# Patient Record
Sex: FEMALE | Race: WHITE | NOT HISPANIC OR LATINO | Employment: FULL TIME | URBAN - METROPOLITAN AREA
[De-identification: names, ages, dates, MRNs, and addresses within clinical notes are randomized per-mention and may not be internally consistent; named-entity substitution may affect disease eponyms.]

---

## 2021-03-26 DIAGNOSIS — Z23 ENCOUNTER FOR IMMUNIZATION: ICD-10-CM

## 2024-09-03 ENCOUNTER — OFFICE VISIT (OUTPATIENT)
Dept: GASTROENTEROLOGY | Facility: CLINIC | Age: 44
End: 2024-09-03
Payer: COMMERCIAL

## 2024-09-03 VITALS
OXYGEN SATURATION: 98 % | HEIGHT: 65 IN | SYSTOLIC BLOOD PRESSURE: 120 MMHG | HEART RATE: 72 BPM | DIASTOLIC BLOOD PRESSURE: 72 MMHG | BODY MASS INDEX: 24.99 KG/M2 | TEMPERATURE: 98 F | WEIGHT: 150 LBS

## 2024-09-03 DIAGNOSIS — Z90.49 HISTORY OF CHOLECYSTECTOMY: ICD-10-CM

## 2024-09-03 DIAGNOSIS — R14.0 ABDOMINAL BLOATING: Primary | ICD-10-CM

## 2024-09-03 DIAGNOSIS — R19.7 DIARRHEA, UNSPECIFIED TYPE: ICD-10-CM

## 2024-09-03 PROCEDURE — 99204 OFFICE O/P NEW MOD 45 MIN: CPT | Performed by: DIETITIAN, REGISTERED

## 2024-09-03 RX ORDER — CETIRIZINE HYDROCHLORIDE 10 MG/1
10 TABLET ORAL DAILY
COMMUNITY

## 2024-09-03 RX ORDER — ETONOGESTREL AND ETHINYL ESTRADIOL VAGINAL RING .015; .12 MG/D; MG/D
1 RING VAGINAL
COMMUNITY
Start: 2018-09-03

## 2024-09-03 RX ORDER — MONTELUKAST SODIUM 4 MG/1
1 TABLET, CHEWABLE ORAL DAILY
Qty: 30 TABLET | Refills: 3 | Status: SHIPPED | OUTPATIENT
Start: 2024-09-03

## 2024-09-03 NOTE — PROGRESS NOTES
[facility] - Outpatient Consultation New Patient  Ashley Pena 43 y.o. female MRN: [id number]  Encounter: [id number]          ASSESSMENT AND PLAN:    1.  Abdominal bloating  2.  Diarrhea  3.  History of cholecystectomy  Patient reports waxing/waning diarrhea dating back to childhood.  She had a cholecystectomy in 2005.  She previously discovered that dairy is a trigger for diarrhea so she avoids this as best she can.  She currently denies any stressors in her life and yet bowel habits remain irregular.  She has 1-2 loose/urgent BMs daily with chronic abdominal bloating, gurgling, fatigue, and intermittent LLQ abdominal pain/tenderness.  She denies any heartburn, severe abdominal pain, blood in the stools, unintentional weight loss.  No known family history of celiac disease, IBD, or CRC.  No prior colonoscopy.  CBC, CMP, TSH, hemoglobin A1c normal 6/10/2024.    -Check celiac disease antibody profile, CRP, fecal calprotectin, fecal elastase.  -Start colestipol 1 gm once daily.  Advised to keep at least 4 hours separate from other medications and discussed potential side effects including constipation.  -Patient will be due for screening colonoscopy in 12/2024.  No red flag symptoms at present that would indicate a sooner/diagnostic colonoscopy.  -Advised patient to call the office or send a [employer] message with any questions/concerns or any new/worsening symptoms.    I discussed informed consent with the patient. The risks/benefits/alternatives of the procedure were discussed with the patient. Risks included, but not limited to, infection, bleeding, perforation, injury to organs in the abdomen, missed lesion and incomplete procedure were discussed. Patient was agreeable.      Follow up 2-3 months.    ________________________________________________________    HPI:  Ashley Pena is a 43 y.o. female who presents for evaluation of diarrhea.  CBC, CMP, TSH, hemoglobin A1c normal  6/10/2024.    Patient reports waxing/waning diarrhea dating back to childhood.  She had a cholecystectomy in 2005.  She previously discovered that dairy is a trigger for diarrhea so she avoids this as best she can.  She currently denies any stressors in her life and yet bowel habits remain irregular.  She has 1-2 loose/urgent BMs daily with chronic abdominal bloating, gurgling, fatigue, and intermittent LLQ abdominal pain/tenderness.  She denies any heartburn, severe abdominal pain, blood in the stools, unintentional weight loss.  No known family history of celiac disease, IBD, or CRC.  No prior colonoscopy.      Answers submitted by the patient for this visit:  Abdominal Pain Questionnaire (Submitted on 9/3/2024)  Chief Complaint: Abdominal pain  Chronicity: chronic  Onset: more than 1 year ago  Onset quality: gradual  Frequency: daily  Episode duration: 12 Months  Progression since onset: waxing and waning  Pain location: LLQ, left flank  Pain - numeric: 4/10  Pain quality: cramping  Radiates to: LLQ, left flank  anorexia: No  arthralgias: No  belching: No  constipation: No  diarrhea: Yes  dysuria: No  fever: No  flatus: Yes  frequency: No  headaches: No  hematochezia: No  hematuria: No  melena: No  myalgias: No  nausea: Yes  weight loss: No  vomiting: No  Aggravated by: eating  Relieved by: bowel movements, passing flatus        REVIEW OF SYSTEMS:  10 point ROS reviewed and negative, except as above    Historical Information   History reviewed. No pertinent past medical history.  Past Surgical History:   Procedure Laterality Date   • CHOLECYSTECTOMY  2004     Social History   Social History     Substance and Sexual Activity   Alcohol Use Yes   • Alcohol/week: 2.0 standard drinks of alcohol   • Types: 1 Glasses of wine, 1 Cans of beer per week     Social History     Substance and Sexual Activity   Drug Use Never     Social History     Tobacco Use   Smoking Status Never   Smokeless Tobacco Never     Family History  "  Problem Relation Age of Onset   • Hypertension Father    • Heart disease Maternal Grandfather        Meds/Allergies       Current Outpatient Medications:   •  cetirizine (ZyrTEC) 10 mg tablet  •  colestipol (COLESTID) 1 g tablet  •  etonogestrel-ethinyl estradiol (NUVARING) 0.12-0.015 MG/24HR vaginal ring    No Known Allergies        Objective   Wt Readings from Last 3 Encounters:   09/03/24 68 kg (150 lb)     Temp Readings from Last 3 Encounters:   09/03/24 98 °F (36.7 °C) (Tympanic)     BP Readings from Last 3 Encounters:   09/03/24 120/72     Pulse Readings from Last 3 Encounters:   09/03/24 72        PHYSICAL EXAM:     Physical Exam  Vitals reviewed.   Constitutional:       General: She is not in acute distress.     Appearance: She is well-developed.   HENT:      Head: Normocephalic and atraumatic.   Eyes:      Conjunctiva/sclera: Conjunctivae normal.   Cardiovascular:      Rate and Rhythm: Normal rate and regular rhythm.      Heart sounds: No murmur heard.  Pulmonary:      Effort: Pulmonary effort is normal. No respiratory distress.      Breath sounds: Normal breath sounds.   Abdominal:      General: Bowel sounds are normal. There is no distension.      Palpations: Abdomen is soft.      Tenderness: There is no abdominal tenderness. There is no guarding.   Musculoskeletal:         General: No swelling.      Cervical back: Neck supple.   Skin:     General: Skin is warm and dry.   Neurological:      Mental Status: She is alert.   Psychiatric:         Mood and Affect: Mood normal.             Lab Results:   No visits with results within 1 Day(s) from this visit.   Latest known visit with results is:   No results found for any previous visit.       No results found for: \"WBC\", \"HGB\", \"HCT\", \"MCV\", \"PLT\"    Lab Results   Component Value Date    SODIUM 139 06/10/2024    K 4.2 06/10/2024     06/10/2024    CO2 22 06/10/2024    AGAP 11 06/10/2024    BUN 14 06/10/2024    CREATININE 0.96 06/10/2024    GLUC 88 " 06/10/2024    CALCIUM 8.9 06/10/2024    AST 14 06/10/2024    ALT 8 06/10/2024    ALKPHOS 62 06/10/2024    TP 6.6 06/10/2024    TBILI 0.5 06/10/2024    EGFR 75 06/10/2024         Radiology Results:   No results found.

## 2024-09-04 DIAGNOSIS — Z00.6 ENCOUNTER FOR EXAMINATION FOR NORMAL COMPARISON OR CONTROL IN CLINICAL RESEARCH PROGRAM: ICD-10-CM

## 2024-09-24 ENCOUNTER — APPOINTMENT (OUTPATIENT)
Dept: LAB | Facility: CLINIC | Age: 44
End: 2024-09-24

## 2024-09-24 DIAGNOSIS — Z00.6 ENCOUNTER FOR EXAMINATION FOR NORMAL COMPARISON OR CONTROL IN CLINICAL RESEARCH PROGRAM: ICD-10-CM

## 2024-09-24 PROCEDURE — 36415 COLL VENOUS BLD VENIPUNCTURE: CPT

## 2024-10-04 LAB
APOB+LDLR+PCSK9 GENE MUT ANL BLD/T: NOT DETECTED
BRCA1+BRCA2 DEL+DUP + FULL MUT ANL BLD/T: NOT DETECTED
MLH1+MSH2+MSH6+PMS2 GN DEL+DUP+FUL M: NOT DETECTED

## 2024-11-13 ENCOUNTER — TELEPHONE (OUTPATIENT)
Age: 44
End: 2024-11-13

## 2024-11-13 ENCOUNTER — OFFICE VISIT (OUTPATIENT)
Age: 44
End: 2024-11-13
Payer: COMMERCIAL

## 2024-11-13 VITALS
SYSTOLIC BLOOD PRESSURE: 118 MMHG | DIASTOLIC BLOOD PRESSURE: 78 MMHG | OXYGEN SATURATION: 98 % | TEMPERATURE: 97.9 F | WEIGHT: 149.4 LBS | HEART RATE: 73 BPM | BODY MASS INDEX: 24.89 KG/M2 | HEIGHT: 65 IN

## 2024-11-13 DIAGNOSIS — Z90.49 HISTORY OF CHOLECYSTECTOMY: ICD-10-CM

## 2024-11-13 DIAGNOSIS — K90.89 BILE SALT-INDUCED DIARRHEA: Primary | ICD-10-CM

## 2024-11-13 DIAGNOSIS — Z12.11 SCREENING FOR COLON CANCER: ICD-10-CM

## 2024-11-13 PROCEDURE — 99214 OFFICE O/P EST MOD 30 MIN: CPT | Performed by: DIETITIAN, REGISTERED

## 2024-11-13 RX ORDER — SODIUM CHLORIDE, SODIUM LACTATE, POTASSIUM CHLORIDE, CALCIUM CHLORIDE 600; 310; 30; 20 MG/100ML; MG/100ML; MG/100ML; MG/100ML
125 INJECTION, SOLUTION INTRAVENOUS CONTINUOUS
OUTPATIENT
Start: 2024-11-13

## 2024-11-13 RX ORDER — MONTELUKAST SODIUM 4 MG/1
1 TABLET, CHEWABLE ORAL DAILY
Qty: 90 TABLET | Refills: 3 | Status: SHIPPED | OUTPATIENT
Start: 2024-11-13

## 2024-11-13 NOTE — ASSESSMENT & PLAN NOTE
Patient has a history of a cholecystectomy in 2005.  She had a chronic history of 1-2 loose/urgent BMs daily with abdominal pain/bloating.  All her symptoms have resolved since starting colestipol 1 g once daily.  Orders:    colestipol (COLESTID) 1 g tablet; Take 1 tablet (1 g total) by mouth daily  -Continue colestipol 1 g once daily.  Advised patient she can take a second tablet as needed, for example if she is eating a fattier meal.

## 2024-11-13 NOTE — TELEPHONE ENCOUNTER
Procedure: colon  Date: March 10th  Physician performing: Dr. Padgett  Location of procedure:  [place]  Instructions given to patient: Miralax  Diabetic: n/a  Clearances: n/a

## 2024-11-13 NOTE — PROGRESS NOTES
Ambulatory Visit  Name: Ashley Pena      : 1980      MRN: [id number]  Encounter Provider: Mitchell De La O PA-C  Encounter Date: 2024   Encounter department: [facility] [place]    Assessment & Plan  Bile salt-induced diarrhea  Patient has a history of a cholecystectomy in .  She had a chronic history of 1-2 loose/urgent BMs daily with abdominal pain/bloating.  All her symptoms have resolved since starting colestipol 1 g once daily.  Orders:    colestipol (COLESTID) 1 g tablet; Take 1 tablet (1 g total) by mouth daily  -Continue colestipol 1 g once daily.  Advised patient she can take a second tablet as needed, for example if she is eating a fattier meal.  History of cholecystectomy  As above  Orders:    colestipol (COLESTID) 1 g tablet; Take 1 tablet (1 g total) by mouth daily    Screening for colon cancer  No family history of CRC.  No prior colonoscopy.  Patient is turning 44 later this month, so we will plan a screening colonoscopy.  Orders:    Colonoscopy; Future  -Schedule colonoscopy in  for routine screening.  I discussed informed consent with the patient. The risks/benefits/alternatives of the procedure were discussed with the patient. Risks included, but not limited to, infection, bleeding, perforation, injury to organs in the abdomen, missed lesion and incomplete procedure were discussed. Patient was agreeable.     Follow up 1 year.      History of Present Illness     Ashley Pena is a 43 y.o. female who presents for follow-up of diarrhea.  Last office visit 2024.  Labs 2024 reviewed.  Celiac disease panel, CRP, fecal calprotectin, and fecal elastase normal.    Patient reports her GI symptoms have completely resolved since starting colestipol 1 g once daily.  She is feeling very well and has no questions or concerns today.        Review of Systems   All other systems reviewed and are negative.          Objective     /78 (BP  "Location: Left arm, Patient Position: Sitting, Cuff Size: Standard)   Pulse 73   Temp 97.9 °F (36.6 °C) (Tympanic)   Ht 5' 5\" (1.651 m)   Wt 67.8 kg (149 lb 6.4 oz)   SpO2 98%   BMI 24.86 kg/m²     Physical Exam  Constitutional:       Appearance: Normal appearance.   HENT:      Head: Normocephalic and atraumatic.   Cardiovascular:      Rate and Rhythm: Normal rate and regular rhythm.   Pulmonary:      Effort: Pulmonary effort is normal.      Breath sounds: Normal breath sounds. No wheezing.   Skin:     Coloration: Skin is not jaundiced or pale.   Neurological:      Mental Status: She is alert.   Psychiatric:         Mood and Affect: Mood normal.         Behavior: Behavior normal.         "

## 2024-12-13 PROBLEM — K90.89 BILE SALT-INDUCED DIARRHEA: Status: RESOLVED | Noted: 2024-11-13 | Resolved: 2024-12-13

## 2025-02-24 ENCOUNTER — ANESTHESIA (OUTPATIENT)
Dept: ANESTHESIOLOGY | Facility: HOSPITAL | Age: 45
End: 2025-02-24

## 2025-02-24 ENCOUNTER — ANESTHESIA EVENT (OUTPATIENT)
Dept: ANESTHESIOLOGY | Facility: HOSPITAL | Age: 45
End: 2025-02-24

## 2025-03-03 ENCOUNTER — TELEPHONE (OUTPATIENT)
Age: 45
End: 2025-03-03

## 2025-03-03 NOTE — TELEPHONE ENCOUNTER
Confirming Upcoming Procedure: colonoscopy on 03/10/2025  Physician performing: Dr Padgett  Location of procedure:  [facility]   Prep: miralax prep     Spoke with patient no question about prep

## 2025-03-10 ENCOUNTER — ANESTHESIA EVENT (OUTPATIENT)
Dept: GASTROENTEROLOGY | Facility: MEDICAL CENTER | Age: 45
End: 2025-03-10
Payer: COMMERCIAL

## 2025-03-10 ENCOUNTER — ANESTHESIA (OUTPATIENT)
Dept: GASTROENTEROLOGY | Facility: MEDICAL CENTER | Age: 45
End: 2025-03-10
Payer: COMMERCIAL

## 2025-03-10 ENCOUNTER — HOSPITAL ENCOUNTER (OUTPATIENT)
Dept: GASTROENTEROLOGY | Facility: MEDICAL CENTER | Age: 45
Setting detail: OUTPATIENT SURGERY
Discharge: HOME | End: 2025-03-10
Payer: COMMERCIAL

## 2025-03-10 VITALS
RESPIRATION RATE: 18 BRPM | SYSTOLIC BLOOD PRESSURE: 121 MMHG | OXYGEN SATURATION: 98 % | TEMPERATURE: 98.3 F | HEART RATE: 71 BPM | DIASTOLIC BLOOD PRESSURE: 60 MMHG

## 2025-03-10 DIAGNOSIS — Z12.11 SCREENING FOR COLON CANCER: ICD-10-CM

## 2025-03-10 PROCEDURE — G0121 COLON CA SCRN NOT HI RSK IND: HCPCS | Performed by: INTERNAL MEDICINE

## 2025-03-10 RX ORDER — PROPOFOL 10 MG/ML
INJECTION, EMULSION INTRAVENOUS AS NEEDED
Status: DISCONTINUED | OUTPATIENT
Start: 2025-03-10 | End: 2025-03-10

## 2025-03-10 RX ORDER — SIMETHICONE 40MG/0.6ML
SUSPENSION, DROPS(FINAL DOSAGE FORM)(ML) ORAL AS NEEDED
Status: COMPLETED | OUTPATIENT
Start: 2025-03-10 | End: 2025-03-10

## 2025-03-10 RX ORDER — SODIUM CHLORIDE, SODIUM LACTATE, POTASSIUM CHLORIDE, CALCIUM CHLORIDE 600; 310; 30; 20 MG/100ML; MG/100ML; MG/100ML; MG/100ML
125 INJECTION, SOLUTION INTRAVENOUS CONTINUOUS
Status: DISCONTINUED | OUTPATIENT
Start: 2025-03-10 | End: 2025-03-14 | Stop reason: HOSPADM

## 2025-03-10 RX ORDER — LIDOCAINE HYDROCHLORIDE 10 MG/ML
INJECTION, SOLUTION EPIDURAL; INFILTRATION; INTRACAUDAL; PERINEURAL AS NEEDED
Status: DISCONTINUED | OUTPATIENT
Start: 2025-03-10 | End: 2025-03-10

## 2025-03-10 RX ORDER — SODIUM CHLORIDE, SODIUM LACTATE, POTASSIUM CHLORIDE, CALCIUM CHLORIDE 600; 310; 30; 20 MG/100ML; MG/100ML; MG/100ML; MG/100ML
INJECTION, SOLUTION INTRAVENOUS CONTINUOUS PRN
Status: DISCONTINUED | OUTPATIENT
Start: 2025-03-10 | End: 2025-03-10

## 2025-03-10 RX ORDER — PROPOFOL 10 MG/ML
INJECTION, EMULSION INTRAVENOUS CONTINUOUS PRN
Status: DISCONTINUED | OUTPATIENT
Start: 2025-03-10 | End: 2025-03-10

## 2025-03-10 RX ADMIN — PROPOFOL 50 MG: 10 INJECTION, EMULSION INTRAVENOUS at 11:58

## 2025-03-10 RX ADMIN — SODIUM CHLORIDE, SODIUM LACTATE, POTASSIUM CHLORIDE, AND CALCIUM CHLORIDE 125 ML/HR: .6; .31; .03; .02 INJECTION, SOLUTION INTRAVENOUS at 11:17

## 2025-03-10 RX ADMIN — PROPOFOL 120 MCG/KG/MIN: 10 INJECTION, EMULSION INTRAVENOUS at 11:41

## 2025-03-10 RX ADMIN — SODIUM CHLORIDE, SODIUM LACTATE, POTASSIUM CHLORIDE, AND CALCIUM CHLORIDE: .6; .31; .03; .02 INJECTION, SOLUTION INTRAVENOUS at 11:41

## 2025-03-10 RX ADMIN — Medication 40 MG: at 11:46

## 2025-03-10 RX ADMIN — PROPOFOL 100 MG: 10 INJECTION, EMULSION INTRAVENOUS at 11:41

## 2025-03-10 RX ADMIN — LIDOCAINE HYDROCHLORIDE 100 MG: 10 INJECTION, SOLUTION EPIDURAL; INFILTRATION; INTRACAUDAL at 11:41

## 2025-03-10 NOTE — H&P
History and Physical -  [facility]  Ashley Pena 44 y.o. female MRN: [id number]                  HPI: Ashley Pena is a 44 y.o. year old female who presents for CRC screening.      REVIEW OF SYSTEMS: Per the HPI, and otherwise unremarkable.    Historical Information   History reviewed. No pertinent past medical history.  Past Surgical History:   Procedure Laterality Date    CHOLECYSTECTOMY  2004     Social History   Social History     Substance and Sexual Activity   Alcohol Use Yes    Alcohol/week: 2.0 standard drinks of alcohol    Types: 1 Glasses of wine, 1 Cans of beer per week     Social History     Substance and Sexual Activity   Drug Use Never     Social History     Tobacco Use   Smoking Status Never   Smokeless Tobacco Never     Family History   Problem Relation Age of Onset    Hypertension Father     Heart disease Maternal Grandfather        Meds/Allergies     Not in a hospital admission.    Allergies   Allergen Reactions    Peanut (Diagnostic) - Food Allergy Headache    Pollen Extract Allergic Rhinitis    Tilactase GI Intolerance       Objective     Blood pressure 131/67, pulse 60, temperature (!) 97 °F (36.1 °C), resp. rate 18, last menstrual period 03/04/2025, SpO2 100%.      PHYSICAL EXAMINATION:    General Appearance:   Alert, cooperative, no distress   HEENT:  Normocephalic, atraumatic, anicteric. Neck supple, symmetrical, trachea midline.   Lungs:   Equal chest rise and unlabored breathing, normal effort, no coughing.   Cardiovascular:   No visualized JVD.   Abdomen:   No abdominal distension.   Skin:   No jaundice, rashes, or lesions.    Musculoskeletal:   Normal range of motion visualized.   Psych:  Normal affect and normal insight.   Neuro:  Alert and appropriate.           ASSESSMENT/PLAN:  This is a 44 y.o. year old female here for colonoscopy, and she is stable and optimized for her procedure.

## 2025-03-10 NOTE — ANESTHESIA PREPROCEDURE EVALUATION
Procedure:  COLONOSCOPY    Relevant Problems   No relevant active problems        Physical Exam    Airway    Mallampati score: III  TM Distance: >3 FB  Neck ROM: full     Dental   No notable dental hx     Cardiovascular  Cardiovascular exam normal    Pulmonary  Pulmonary exam normal     Other Findings  post-pubertal.      Anesthesia Plan  ASA Score- 1     Anesthesia Type- IV sedation with anesthesia with ASA Monitors.         Additional Monitors:     Airway Plan:            Plan Factors-Exercise tolerance (METS): >4 METS.    Chart reviewed.    Patient summary reviewed.    Patient is not a current smoker.              Induction- intravenous.    Postoperative Plan-         Informed Consent- Anesthetic plan and risks discussed with patient.        NPO Status:  Vitals Value Taken Time   Date of last liquid 03/10/25 03/10/25 1056   Time of last liquid 0645 03/10/25 1056   Date of last solid 03/08/25 03/10/25 1056   Time of last solid 2000 03/10/25 1056

## 2025-03-10 NOTE — ANESTHESIA POSTPROCEDURE EVALUATION
Post-Op Assessment Note    CV Status:  Stable  Pain Score: 0    Pain management: adequate       Mental Status:  Sleepy   Hydration Status:  Stable   PONV Controlled:  None   Airway Patency:  Patent     Post Op Vitals Reviewed: Yes    No anethesia notable event occurred.    Staff: Anesthesiologist, CRNA           Last Filed PACU Vitals:  Vitals Value Taken Time   Temp 98.3 °F (36.8 °C) 03/10/25 1203   Pulse 71 03/10/25 1220   /60 03/10/25 1220   Resp 18 03/10/25 1220   SpO2 98 % 03/10/25 1220       Modified Jr:     Vitals Value Taken Time   Activity 2 03/10/25 1225   Respiration 2 03/10/25 1204   Circulation 2 03/10/25 1225   Consciousness 2 03/10/25 1225   Oxygen Saturation 2 03/10/25 1204     Modified Jr Score: 6